# Patient Record
Sex: FEMALE | Race: WHITE | NOT HISPANIC OR LATINO | Employment: UNEMPLOYED | RURAL
[De-identification: names, ages, dates, MRNs, and addresses within clinical notes are randomized per-mention and may not be internally consistent; named-entity substitution may affect disease eponyms.]

---

## 2023-07-20 ENCOUNTER — HOSPITAL ENCOUNTER (EMERGENCY)
Facility: HOSPITAL | Age: 23
Discharge: HOME | End: 2023-07-21
Attending: EMERGENCY MEDICINE

## 2023-07-20 ENCOUNTER — HOSPITAL ENCOUNTER (EMERGENCY)
Facility: HOSPITAL | Age: 23
Discharge: ANOTHER HOSPITAL | End: 2023-07-20

## 2023-07-20 VITALS
DIASTOLIC BLOOD PRESSURE: 83 MMHG | TEMPERATURE: 99 F | HEART RATE: 80 BPM | RESPIRATION RATE: 16 BRPM | WEIGHT: 154 LBS | SYSTOLIC BLOOD PRESSURE: 140 MMHG | OXYGEN SATURATION: 98 %

## 2023-07-20 DIAGNOSIS — O20.0 THREATENED MISCARRIAGE IN EARLY PREGNANCY: Primary | ICD-10-CM

## 2023-07-20 DIAGNOSIS — R10.32 ACUTE LEFT LOWER QUADRANT PAIN: ICD-10-CM

## 2023-07-20 DIAGNOSIS — O03.9 ABORTION: Primary | ICD-10-CM

## 2023-07-20 DIAGNOSIS — E87.6 HYPOKALEMIA: ICD-10-CM

## 2023-07-20 LAB
BACTERIA #/AREA URNS HPF: ABNORMAL /HPF
BILIRUB UR QL STRIP: ABNORMAL
CLARITY UR: CLEAR
COLOR UR: YELLOW
GLUCOSE UR STRIP-MCNC: NEGATIVE MG/DL
HCG UR QL IA.RAPID: POSITIVE
KETONES UR STRIP-SCNC: ABNORMAL MG/DL
LEUKOCYTE ESTERASE UR QL STRIP: NEGATIVE
NITRITE UR QL STRIP: NEGATIVE
PH UR STRIP: 6 PH UNITS
PROT UR QL STRIP: >=300
RBC # UR STRIP: ABNORMAL /UL
RBC #/AREA URNS HPF: ABNORMAL /HPF
SP GR UR STRIP: 1.02
UROBILINOGEN UR STRIP-ACNC: 4 MG/DL
WBC #/AREA URNS HPF: ABNORMAL /HPF

## 2023-07-20 PROCEDURE — 99285 EMERGENCY DEPT VISIT HI MDM: CPT | Mod: 25

## 2023-07-20 PROCEDURE — 99284 EMERGENCY DEPT VISIT MOD MDM: CPT | Mod: ,,, | Performed by: EMERGENCY MEDICINE

## 2023-07-20 PROCEDURE — 99284 PR EMERGENCY DEPT VISIT,LEVEL IV: ICD-10-PCS | Mod: ,,, | Performed by: NURSE PRACTITIONER

## 2023-07-20 PROCEDURE — 81001 URINALYSIS AUTO W/SCOPE: CPT | Performed by: NURSE PRACTITIONER

## 2023-07-20 PROCEDURE — 81025 URINE PREGNANCY TEST: CPT | Performed by: NURSE PRACTITIONER

## 2023-07-20 PROCEDURE — 99284 EMERGENCY DEPT VISIT MOD MDM: CPT | Mod: ,,, | Performed by: NURSE PRACTITIONER

## 2023-07-20 PROCEDURE — 96365 THER/PROPH/DIAG IV INF INIT: CPT

## 2023-07-20 PROCEDURE — 99284 PR EMERGENCY DEPT VISIT,LEVEL IV: ICD-10-PCS | Mod: ,,, | Performed by: EMERGENCY MEDICINE

## 2023-07-21 ENCOUNTER — TELEPHONE (OUTPATIENT)
Dept: EMERGENCY MEDICINE | Facility: HOSPITAL | Age: 23
End: 2023-07-21

## 2023-07-21 VITALS
BODY MASS INDEX: 23.34 KG/M2 | HEART RATE: 70 BPM | RESPIRATION RATE: 18 BRPM | OXYGEN SATURATION: 99 % | SYSTOLIC BLOOD PRESSURE: 115 MMHG | TEMPERATURE: 98 F | WEIGHT: 154 LBS | DIASTOLIC BLOOD PRESSURE: 75 MMHG | HEIGHT: 68 IN

## 2023-07-21 LAB
ABORH RETYPE: NORMAL
ALBUMIN SERPL BCP-MCNC: 3.6 G/DL (ref 3.5–5)
ALBUMIN/GLOB SERPL: 0.9 {RATIO}
ALP SERPL-CCNC: 85 U/L (ref 37–98)
ALT SERPL W P-5'-P-CCNC: 8 U/L (ref 13–56)
ANION GAP SERPL CALCULATED.3IONS-SCNC: 8 MMOL/L (ref 7–16)
AST SERPL W P-5'-P-CCNC: 5 U/L (ref 15–37)
BASOPHILS # BLD AUTO: 0.07 K/UL (ref 0–0.2)
BASOPHILS NFR BLD AUTO: 0.5 % (ref 0–1)
BILIRUB SERPL-MCNC: 0.3 MG/DL (ref ?–1.2)
BUN SERPL-MCNC: 4 MG/DL (ref 7–18)
BUN/CREAT SERPL: 6 (ref 6–20)
CALCIUM SERPL-MCNC: 9 MG/DL (ref 8.5–10.1)
CHLORIDE SERPL-SCNC: 100 MMOL/L (ref 98–107)
CO2 SERPL-SCNC: 31 MMOL/L (ref 21–32)
CREAT SERPL-MCNC: 0.62 MG/DL (ref 0.55–1.02)
DIFFERENTIAL METHOD BLD: ABNORMAL
EGFR (NO RACE VARIABLE) (RUSH/TITUS): 129 ML/MIN/1.73M2
EOSINOPHIL # BLD AUTO: 0.19 K/UL (ref 0–0.5)
EOSINOPHIL NFR BLD AUTO: 1.4 % (ref 1–4)
ERYTHROCYTE [DISTWIDTH] IN BLOOD BY AUTOMATED COUNT: 11.7 % (ref 11.5–14.5)
GLOBULIN SER-MCNC: 3.8 G/DL (ref 2–4)
GLUCOSE SERPL-MCNC: 98 MG/DL (ref 74–106)
HCT VFR BLD AUTO: 39.3 % (ref 38–47)
HGB BLD-MCNC: 13.3 G/DL (ref 12–16)
IMM GRANULOCYTES # BLD AUTO: 0.04 K/UL (ref 0–0.04)
IMM GRANULOCYTES NFR BLD: 0.3 % (ref 0–0.4)
LYMPHOCYTES # BLD AUTO: 3.88 K/UL (ref 1–4.8)
LYMPHOCYTES NFR BLD AUTO: 29 % (ref 27–41)
MCH RBC QN AUTO: 31.3 PG (ref 27–31)
MCHC RBC AUTO-ENTMCNC: 33.8 G/DL (ref 32–36)
MCV RBC AUTO: 92.5 FL (ref 80–96)
MONOCYTES # BLD AUTO: 0.88 K/UL (ref 0–0.8)
MONOCYTES NFR BLD AUTO: 6.6 % (ref 2–6)
MPC BLD CALC-MCNC: 12 FL (ref 9.4–12.4)
NEUTROPHILS # BLD AUTO: 8.31 K/UL (ref 1.8–7.7)
NEUTROPHILS NFR BLD AUTO: 62.2 % (ref 53–65)
NRBC # BLD AUTO: 0 X10E3/UL
NRBC, AUTO (.00): 0 %
PLATELET # BLD AUTO: 214 K/UL (ref 150–400)
POTASSIUM SERPL-SCNC: 3 MMOL/L (ref 3.5–5.1)
PROT SERPL-MCNC: 7.4 G/DL (ref 6.4–8.2)
RBC # BLD AUTO: 4.25 M/UL (ref 4.2–5.4)
RH BLD: NORMAL
RH IMMUNE GLOBULIN: NORMAL
SODIUM SERPL-SCNC: 136 MMOL/L (ref 136–145)
WBC # BLD AUTO: 13.37 K/UL (ref 4.5–11)

## 2023-07-21 PROCEDURE — 80053 COMPREHEN METABOLIC PANEL: CPT | Performed by: EMERGENCY MEDICINE

## 2023-07-21 PROCEDURE — 86900 BLOOD TYPING SEROLOGIC ABO: CPT | Performed by: EMERGENCY MEDICINE

## 2023-07-21 PROCEDURE — 85025 COMPLETE CBC W/AUTO DIFF WBC: CPT | Performed by: EMERGENCY MEDICINE

## 2023-07-21 PROCEDURE — 63600175 PHARM REV CODE 636 W HCPCS: Performed by: EMERGENCY MEDICINE

## 2023-07-21 PROCEDURE — 25000003 PHARM REV CODE 250: Performed by: EMERGENCY MEDICINE

## 2023-07-21 RX ORDER — POTASSIUM CHLORIDE 20 MEQ/1
40 TABLET, EXTENDED RELEASE ORAL
Status: COMPLETED | OUTPATIENT
Start: 2023-07-21 | End: 2023-07-21

## 2023-07-21 RX ADMIN — SODIUM CHLORIDE 1000 ML: 9 INJECTION, SOLUTION INTRAVENOUS at 01:07

## 2023-07-21 RX ADMIN — POTASSIUM CHLORIDE 40 MEQ: 1500 TABLET, EXTENDED RELEASE ORAL at 02:07

## 2023-07-21 RX ADMIN — CEFAZOLIN 2 G: 2 INJECTION, POWDER, FOR SOLUTION INTRAMUSCULAR; INTRAVENOUS at 01:07

## 2023-07-21 NOTE — ED NOTES
Consent obtain for immune globulin. Pt verbalizes understatement of shot. Immune Globulin shot given IM per MD order.

## 2023-07-21 NOTE — ED TRIAGE NOTES
22 year old  female to ED for vaginal bleeding in pregnancy. Patient believes she is 6 weeks pregnant. LMP 2023. Patient state bleeding started light yesterday and worsened over the last few hours today.

## 2023-07-21 NOTE — ED NOTES
Report called to Ron at [facility]. Patient signed transfer form and will be heading to other facility POV with her . Accepted by Dr. Byrd. Patient did leave the department at this time in stable condition. She denies dizziness on standing and vitals are stable on release. Patient did go to the bathroom before her release from the ED to change her pad, and states she had another clot come out. She complained of a crampy abdominal pain before going into the bathroom but states the pain went away after.

## 2023-07-21 NOTE — ED PROVIDER NOTES
Encounter Date: 2023       History     Chief Complaint   Patient presents with    Vaginal Bleeding     22 yr old WF with no significant PMH to ED with c/o left lower abd pain.  Pt is pregnant.    - 6 weeks gestation.  States having pink discharge yesterday and heavier bleeding (like menstrual cycle) for the past 2-3 hours.  Reports LMP 7/5 but reports only 2 days and lighter than usual.      The history is provided by the patient.   Abdominal Pain  The current episode started yesterday. The abdominal pain is located in the LLQ. The abdominal pain does not radiate. The abdominal pain is relieved by nothing.   The patient states that she believes she is currently pregnant.   Review of patient's allergies indicates:  No Known Allergies  History reviewed. No pertinent past medical history.  History reviewed. No pertinent surgical history.  History reviewed. No pertinent family history.  Social History     Tobacco Use    Smoking status: Never    Smokeless tobacco: Never   Substance Use Topics    Alcohol use: Never    Drug use: Never     Review of Systems   Constitutional: Negative.    Respiratory: Negative.     Cardiovascular: Negative.    Gastrointestinal:  Positive for abdominal pain.   Genitourinary:  Positive for pelvic pain. Negative for difficulty urinating.   Musculoskeletal: Negative.      Physical Exam     Initial Vitals [23]   BP Pulse Resp Temp SpO2   (!) 140/83 85 18 98.5 °F (36.9 °C) 98 %      MAP       --         Physical Exam    Nursing note and vitals reviewed.  Constitutional: She appears well-developed and well-nourished.   Cardiovascular:  Normal rate and regular rhythm.           Pulmonary/Chest: Breath sounds normal.   Abdominal: Abdomen is soft. There is abdominal tenderness in the left lower quadrant.         Medical Screening Exam   See Full Note    ED Course   Procedures  Labs Reviewed   URINALYSIS - Abnormal; Notable for the following components:       Result Value     Protein, UA >=300 (*)     Urobilinogen, UA 4.0 (*)     Bilirubin, UA Small (*)     Blood, UA Large (*)     All other components within normal limits   HCG QUALITATIVE URINE - Abnormal; Notable for the following components:    HCG Qualitative, Urine Positive (*)     All other components within normal limits   URINALYSIS, MICROSCOPIC - Abnormal; Notable for the following components:    RBC, UA Too Numerous To Count (*)     All other components within normal limits          Imaging Results    None          Medications - No data to display  Medical Decision Making:   Initial Assessment:   22 yr old WF with no significant PMH to ED with c/o left lower abd pain.  Pt is pregnant.    - 6 weeks gestation.  States having pink discharge yesterday and heavier bleeding (like menstrual cycle) for the past 2-3 hours.  Reports LMP 7/5 but reports only 2 days and lighter than usual.    Differential Diagnosis:   Ectopic pregnancy  Ovarian cyst    Clinical Tests:   Lab Tests: Ordered                  .: Patient refused recommended mode of transport, explained increased risk. (pt and family member agree to go directly to [facility])    Clinical Impression:   Final diagnoses:  [O20.0] Threatened miscarriage in early pregnancy (Primary)  [R10.32] Acute left lower quadrant pain - concern for ectopic pregnancy        ED Disposition Condition    Transfer to Another Facility Stable                Radha Mendoza, AVA  [phone]

## 2023-07-21 NOTE — ED PROVIDER NOTES
[facility]    [address] [address]    Phone:  [phone]       Thank You for choosing us for your health care visit. We are glad to serve you and happy to provide you with this summary of your visit. Please help us to ensure we have accurate records. If you find anything that needs to be changed, please let our staff know as soon as possible.          Your Demographic Information     Patient Name Sex     Lois Casas Female 1947       Ethnic Group Patient Race    Not of  or  Origin White      Your Visit Details     Date & Time Provider Department    2017 2:00 PM Chuck Harkins MD [facility]      Your Upcoming Appointment*(Max 10)       4:00 PM CST   Follow-up Visit with ROSSY Rodgers   [facility])    [address] [address] [phone]            2017  4:00 PM CDT   Medicare Well Visit with Chuck Harkins MD   [facility] ([facility])    [address] [id number] [phone]              Your Vitals Were     BP Pulse Temp Resp Height Weight    122/82 54 97.1 °F (36.2 °C) (Tympanic) 16 5' 0.5\" (1.537 m) 105 lb 9.6 oz (47.9 kg)    SpO2 BMI Smoking Status             99% 20.28 kg/m2 Never Smoker         Medications Prescribed or Re-Ordered Today     None      Your Current Medications Are        Disp Refills Start End    MAGNESIUM OXIDE PO        Class: Historical Med    Route: Oral    pantoprazole (PROTONIX) 40 MG tablet 60 tablet 9 2016     Sig - Route: Take 1 tablet by mouth 2 times daily. Patient to take am and pm. - Oral    Class: Eprescribe    losartan-hydrochlorothiazide (HYZAAR) 100-25 MG per tablet 90 tablet 0 2016     Sig -  Encounter Date: 7/20/2023       History     Chief Complaint   Patient presents with    Abdominal Pain     Left lower abd pain for 24 hours. Pain resolved at this time. Does reports passing large clot prior to arrival. Still having vaginal bleeding.      A 22-year-old female patient who is 6 weeks pregnant is transferred from [facility] Emergency Department because of vaginal bleeding and left lower quadrant abdominal pain.  The patient presented there with abdominal pain and vaginal bleeding that started on and off since yesterday.    The history is provided by the patient. No  was used.   Review of patient's allergies indicates:  No Known Allergies  No past medical history on file.  No past surgical history on file.  No family history on file.  Social History     Tobacco Use    Smoking status: Never    Smokeless tobacco: Never   Substance Use Topics    Alcohol use: Never    Drug use: Never     Review of Systems   Constitutional:  Negative for fever.   HENT:  Negative for sore throat.    Respiratory:  Negative for shortness of breath.    Cardiovascular:  Negative for chest pain.   Gastrointestinal:  Negative for nausea.   Genitourinary:  Positive for vaginal bleeding. Negative for dysuria.   Musculoskeletal:  Negative for back pain.   Skin:  Negative for rash.   Neurological:  Negative for weakness.   Hematological:  Does not bruise/bleed easily.     Physical Exam     Initial Vitals [07/20/23 2321]   BP Pulse Resp Temp SpO2   127/80 85 14 98.4 °F (36.9 °C) 100 %      MAP       --         Physical Exam    Nursing note and vitals reviewed.  Constitutional: She appears well-developed and well-nourished.   HENT:   Head: Normocephalic and atraumatic.   Eyes: EOM are normal. Pupils are equal, round, and reactive to light.   Neck: Neck supple.   Normal range of motion.  Cardiovascular:  Normal rate and regular rhythm.           No murmur heard.  Pulmonary/Chest: Breath sounds  Route: Take 1 tablet by mouth daily. - Oral    Class: Eprescribe    Cosign for Ordering: Accepted by Chuck Harkins MD on 11/9/2016  3:10 PM    levothyroxine (LEVOXYL) 112 MCG tablet 90 tablet 0 11/9/2016     Sig - Route: Take 1 tablet by mouth daily. - Oral    Class: Eprescribe    Cosign for Ordering: Accepted by Chuck Harkins MD on 11/9/2016  3:10 PM    ALPRAZolam (XANAX) 0.5 MG tablet 30 tablet 0 11/9/2016     Sig - Route: Take 1 tablet by mouth 3 times daily as needed for Anxiety. - Oral    Notes to Pharmacy: Call to [facility].    Cosign for Ordering: Accepted by Chuck Harkins MD on 11/9/2016  3:10 PM    Calcium Carbonate-Vitamin D (CALCIUM 600+D PO)        Sig - Route: Take 1 tablet by mouth 2 times daily. - Oral    Class: Historical Med    Probiotic Product (VICENTE-Q PO)        Sig - Route: Take  by mouth. - Oral    Class: Historical Med    B Complex-C (B COMPLEX-VITAMIN C PO)        Sig - Route: Take 1 tablet by mouth daily. - Oral    Class: Historical Med    Multiple Vitamin capsule        Sig - Route: Take 1 capsule by mouth daily. - Oral    Class: Historical Med      Allergies     Beta Adrenergic Blockers Other (See Comments)    Bradycardia      Immunizations History as of 2/17/2017     Name Date    HERPES ZOSTER SHINGLES 10/28/2013  9:10 AM    Hepatitis B Adult 4/11/1995, 10/11/1994, 9/13/1994    INFLUENZA QUADRIVALENT 10/29/2015, 11/17/2014    Influenza 10/28/2013  9:08 AM, 11/5/2012, 10/4/2011, 10/19/2010, 10/15/2009, 10/23/2008, 11/12/2007, 11/7/2006, 12/9/2005, 12/14/2004, 10/22/2003, 12/26/2000    Influenza A novel H1N1 12/10/2009    Influenza High Dose Pres Free 10/5/2016    Pneumococcal Conjugate 13 Valent 10/29/2015    Pneumococcal Polysaccharide Adult 4/17/2012    Tdap 12/1/2010, 9/13/2008      Problem List as of 2/17/2017     Hypertension    Anxiety    Mitral valve prolapse    Hypothyroid    Osteoarthritis    Lyme disease    Knee pain    Health care maintenance    Chronic right  normal.   Abdominal: Abdomen is soft. Bowel sounds are normal.   Musculoskeletal:         General: Normal range of motion.      Cervical back: Normal range of motion and neck supple.     Neurological: She is alert and oriented to person, place, and time. GCS score is 15. GCS eye subscore is 4. GCS verbal subscore is 5. GCS motor subscore is 6.   Skin: Skin is warm and dry. Capillary refill takes less than 2 seconds.   Psychiatric: She has a normal mood and affect.       Medical Screening Exam   See Full Note    ED Course   Procedures  Labs Reviewed   COMPREHENSIVE METABOLIC PANEL - Abnormal; Notable for the following components:       Result Value    Potassium 3.0 (*)     BUN 4 (*)     ALT 8 (*)     AST 5 (*)     All other components within normal limits   CBC WITH DIFFERENTIAL - Abnormal; Notable for the following components:    WBC 13.37 (*)     MCH 31.3 (*)     Monocytes % 6.6 (*)     Neutrophils, Abs 8.31 (*)     Monocytes, Absolute 0.88 (*)     All other components within normal limits   CBC W/ AUTO DIFFERENTIAL    Narrative:     The following orders were created for panel order CBC W/ AUTO DIFFERENTIAL.  Procedure                               Abnormality         Status                     ---------                               -----------         ------                     CBC with Differential[[id number]]        Abnormal            Final result                 Please view results for these tests on the individual orders.   EXTRA TUBES    Narrative:     The following orders were created for panel order EXTRA TUBES.  Procedure                               Abnormality         Status                     ---------                               -----------         ------                     Light Blue Top Hold[[id number]]                              In process                 Lavender Top Hold[[id number]]                                In process                 Gold Top Hold[[id number]]                                     In process                   Please view results for these tests on the individual orders.   LIGHT BLUE TOP HOLD   LAVENDER TOP HOLD   GOLD TOP HOLD   GROUP & RH   ABORH RETYPE   PREPARE RH IMMUNE GLOBULIN          Imaging Results              US OB <14 Wks TransAbd & TransVag, Single Gestation (XPD) (Final result)  Result time 23 07:41:38      Final result by Christoph Starks DO (23 07:41:38)                   Impression:      No gestational sac identified.  No free fluid.  Trending HCG and short-term follow-up ultrasound recommended.    No acute findings.    Real time ultrasound images captured and stored.      Electronically signed by: Christoph Starks  Date:    2023  Time:    07:41               Narrative:    EXAMINATION:  US OB <14 WEEKS, TRANSABDOM & TRANSVAG, SINGLE GESTATION (XPD)    DATE AND TIME OF EXAMINATION: 07:40    CLINICAL HISTORY:  left lower abd pain;    TECHNIQUE:  US OB <14 WEEKS, TRANSABDOM & TRANSVAG, SINGLE GESTATION (XPD)    COMPARISON:  None.    FINDINGS:  The uterus measures 9 x 5 x 3 cm.  The maternal ovaries are normal.  There is color Doppler flow to the ovaries.    Endometrial stripe is normal in size measuring 0.6 cm.  Mild fluid within the endometrial canal.    No gestational sac identified.                                       Medications   sodium chloride 0.9% bolus 1,000 mL 1,000 mL (0 mLs Intravenous Stopped 23)   ceFAZolin 2 g in dextrose 5 % in water (D5W) 5 % 50 mL IVPB (MB+) (0 g Intravenous Stopped 23)   potassium chloride SA CR tablet 40 mEq (40 mEq Oral Given 23 0241)     Medical Decision Making:   Initial Assessment:   A 6 week pregnant female patient transferred from [facility] with vaginal bleed and lower abdominal pain.  The patient states that her ambulance ride she passed a lot a large clot.  Differential Diagnosis:     Ectopic pregnancy  Vaginal bleeding with pain  Clinical Tests:  hip pain            Patient Instructions     None         Lab Tests: Ordered and Reviewed       <> Summary of Lab: Labs Reviewed  COMPREHENSIVE METABOLIC PANEL - Abnormal; Notable for the following components:      Result Value   Potassium 3.0 (*)    BUN 4 (*)    ALT 8 (*)    AST 5 (*)    All other components within normal limits  CBC WITH DIFFERENTIAL - Abnormal; Notable for the following components:   WBC 13.37 (*)    MCH 31.3 (*)    Monocytes % 6.6 (*)    Neutrophils, Abs 8.31 (*)    Monocytes, Absolute 0.88 (*)    All other components within normal limits                           ED Management:  Bedside ultrasound revealed no intrauterine gestational sac or fetal pole seen.  The patient is found to be Rh negative so she was given RhoGAM intramuscularly.  I will discharge the patient home recommend sonographic follow-up with serial beta-hCG  Other:   I have discussed this case with another health care provider.       <> Summary of the Discussion: I discussed patient condition with Dr. Alvarez (OB hospitalist).                       Clinical Impression:   Final diagnoses:  [O03.9]  (Primary)  [E87.6] Hypokalemia        ED Disposition Condition    Discharge Stable          ED Prescriptions    None       Follow-up Information    None          Ludwig Byrd MD  23 [phone]